# Patient Record
Sex: MALE | Race: WHITE | URBAN - METROPOLITAN AREA
[De-identification: names, ages, dates, MRNs, and addresses within clinical notes are randomized per-mention and may not be internally consistent; named-entity substitution may affect disease eponyms.]

---

## 2017-06-21 ENCOUNTER — OFFICE VISIT (OUTPATIENT)
Dept: ENDOCRINOLOGY | Age: 42
End: 2017-06-21

## 2017-06-21 VITALS
HEART RATE: 103 BPM | RESPIRATION RATE: 18 BRPM | WEIGHT: 199 LBS | OXYGEN SATURATION: 96 % | BODY MASS INDEX: 29.47 KG/M2 | HEIGHT: 69 IN | SYSTOLIC BLOOD PRESSURE: 143 MMHG | DIASTOLIC BLOOD PRESSURE: 95 MMHG | TEMPERATURE: 98.6 F

## 2017-06-21 DIAGNOSIS — R73.03 PREDIABETES: Primary | ICD-10-CM

## 2017-06-21 DIAGNOSIS — E78.2 MIXED HYPERLIPIDEMIA: ICD-10-CM

## 2017-06-21 LAB
GLUCOSE POC: 112 MG/DL
HBA1C MFR BLD HPLC: 5.9 %

## 2017-06-21 RX ORDER — ACETAMINOPHEN 325 MG/1
TABLET ORAL
COMMUNITY

## 2017-06-21 RX ORDER — IBUPROFEN 800 MG/1
TABLET ORAL
COMMUNITY

## 2017-06-21 RX ORDER — PRAVASTATIN SODIUM 20 MG/1
20 TABLET ORAL
COMMUNITY

## 2017-06-21 NOTE — MR AVS SNAPSHOT
Visit Information Date & Time Provider Department Dept. Phone Encounter #  
 6/21/2017 11:00 AM Demarcus Hernandez MD [facility] [phone] [id number] Follow-up Instructions Return if symptoms worsen or fail to improve. Upcoming Health Maintenance Date Due DTaP/Tdap/Td series (1 - Tdap) 6/15/1995 INFLUENZA AGE 9 TO ADULT 8/1/2017 Allergies as of 6/21/2017  Review Complete On: 6/21/2017 By: Demarcus Hernandez MD  
 No Known Allergies Current Immunizations  Never Reviewed No immunizations on file. Not reviewed this visit You Were Diagnosed With   
  
 Codes Comments Prediabetes    -  Primary ICD-10-CM: R73.03 
ICD-9-CM: 790.29 Vitals BP Pulse Temp Resp Height(growth percentile) Weight(growth percentile) (!) 143/95 (BP 1 Location: Right arm, BP Patient Position: Sitting) (!) 103 98.6 °F (37 °C) (Oral) 18 5' 9\" (1.753 m) 199 lb (90.3 kg) SpO2 BMI Smoking Status 96% 29.39 kg/m2 Current Every Day Smoker BMI and BSA Data Body Mass Index Body Surface Area  
 29.39 kg/m 2 2.1 m 2 Your Updated Medication List  
  
   
This list is accurate as of: 6/21/17 12:11 PM.  Always use your most recent med list.  
  
  
  
  
 ibuprofen 800 mg tablet Commonly known as:  MOTRIN Take  by mouth.  
  
 pravastatin 20 mg tablet Commonly known as:  PRAVACHOL Take 20 mg by mouth nightly. TYLENOL 325 mg tablet Generic drug:  acetaminophen Take  by mouth every four (4) hours as needed for Pain. We Performed the Following AMB POC GLUCOSE, QUANTITATIVE, BLOOD [93125 CPT(R)] AMB POC HEMOGLOBIN A1C [25803 CPT(R)] Follow-up Instructions Return if symptoms worsen or fail to improve. Introducing [facility]! [facility] introduces [employer] patient portal. Now you can access parts of your medical record, email your doctor's office, and request medication refills online. 1. In your internet browser, go to [email or web address] 2. Click on the First Time User? Click Here link in the Sign In box. You will see the New Member Sign Up page. 3. Enter your [employer] Access Code exactly as it appears below. You will not need to use this code after youve completed the sign-up process. If you do not sign up before the expiration date, you must request a new code. · [employer] Access Code: [email or web address] Expires: 9/19/2017 12:11 PM 
 
4. Enter the last four digits of your Social Security Number (xxxx) and Date of Birth (mm/dd/yyyy) as indicated and click Submit. You will be taken to the next sign-up page. 5. Create a [employer] ID. This will be your [employer] login ID and cannot be changed, so think of one that is secure and easy to remember. 6. Create a [employer] password. You can change your password at any time. 7. Enter your Password Reset Question and Answer. This can be used at a later time if you forget your password. 8. Enter your e-mail address. You will receive e-mail notification when new information is available in [address] 9. Click Sign Up. You can now view and download portions of your medical record. 10. Click the Download Summary menu link to download a portable copy of your medical information. If you have questions, please visit the Frequently Asked Questions section of the [employer] website. Remember, [employer] is NOT to be used for urgent needs. For medical emergencies, dial 911. Now available from your iPhone and Android! Please provide this summary of care documentation to your next provider. If you have any questions after today's visit, please call [phone].

## 2017-06-21 NOTE — LETTER
6/29/2017 7:46 PM 
 
Patient:  Mitch Avitia YOB: 1975 Date of Visit: 6/21/2017 Dear physician VIA Facsimile: [phone] 
 : Thank you for referring Mr. Mitch Avitia to me for evaluation/treatment. Below are the relevant portions of my assessment and plan of care. Wt Readings from Last 3 Encounters:  
06/21/17 199 lb (90.3 kg) Temp Readings from Last 3 Encounters:  
06/21/17 98.6 °F (37 °C) (Oral) BP Readings from Last 3 Encounters:  
06/21/17 (!) 143/95 Pulse Readings from Last 3 Encounters:  
06/21/17 (!) 103 Eye exam: yes Foot exam: no 
 
HISTORY OF PRESENT ILLNESS Mitch Avitia is a 36 y.o. male. HPI Initial visit for Prediabetes/ diabetes evaluation and   dysmetabolic syndrome He had prediabetic numbers in feb 2017 labs He has few home run  Numbers which are over 140 mg He is interested in knowing the targets for blood glucose levels etc.,  
 
 
 
 
 
Past Medical History:  
Diagnosis Date  Diabetes ([address] 2017  Erectile dysfunction  Hyperlipidemia 2015 Social History Social History  Marital status:  Spouse name: N/A  
 Number of children: N/A  
 Years of education: N/A Occupational History  Not on file. Social History Main Topics  Smoking status: Current Every Day Smoker Packs/day: 1.00 Years: 30.00 Types: Cigarettes  Smokeless tobacco: Never Used  Alcohol use Yes Comment: rarely  Drug use: No  
 Sexual activity: Not on file Other Topics Concern  Not on file Social History Narrative  No narrative on file Family History Problem Relation Age of Onset  No Known Problems Mother  Diabetes Father  Hypertension Father Review of Systems Constitutional: Negative. HENT: Negative. Eyes: Negative for pain and redness. Respiratory: Negative. Cardiovascular: Negative for chest pain, palpitations and leg swelling. Gastrointestinal: Negative. Negative for constipation. Genitourinary: Negative. Musculoskeletal: Negative for myalgias. Skin: Negative. Neurological: Negative. Endo/Heme/Allergies: Negative. Psychiatric/Behavioral: Negative for depression and memory loss. The patient does not have insomnia. Physical Exam  
Constitutional: He is oriented to person, place, and time. He appears well-developed and well-nourished. HENT:  
Head: Normocephalic. Eyes: Conjunctivae and EOM are normal. Pupils are equal, round, and reactive to light. Neck: Normal range of motion. Neck supple. No JVD present. No tracheal deviation present. No thyromegaly present. Cardiovascular: Normal rate, regular rhythm and normal heart sounds. Pulmonary/Chest: Effort normal and breath sounds normal.  
Abdominal: Soft. Bowel sounds are normal.  
Musculoskeletal: Normal range of motion. Lymphadenopathy:  
  He has no cervical adenopathy. Neurological: He is alert and oriented to person, place, and time. He has normal reflexes. Skin: Skin is warm. Psychiatric: He has a normal mood and affect. ASSESSMENT and PLAN 1. Prediabetes : a1c is 5.9 %   From today by finger stick compared to 6.1 % Explained about patho-physiology of prediabetes He is deferring meds 2. Hyperlipidemia : on pravachol But he says he is NOT compliant LDL is 170  - jan 2017 Parents ? Unsure [address] father had CAD at 79 Explained to him the need for statin compliance to prevent a CVD event F/u prn He will send me fasting labs If you have questions, please do not hesitate to call me. I look forward to following Mr. Milton Rouse along with you. Sincerely, Roro Mike MD

## 2017-06-21 NOTE — PROGRESS NOTES
HISTORY OF PRESENT ILLNESS  Roxy Maurice is a 36 y.o. male. HPI    Initial visit for Prediabetes/ diabetes evaluation and   dysmetabolic syndrome     He had prediabetic numbers in feb 2017 labs  He has few home run  Numbers which are over 140 mg     He is interested in knowing the targets for blood glucose levels etc.,             Past Medical History:   Diagnosis Date    Diabetes ([facility] [address] 2017    Erectile dysfunction     Hyperlipidemia 2015       Social History     Social History    Marital status:      Spouse name: N/A    Number of children: N/A    Years of education: N/A     Occupational History    Not on file. Social History Main Topics    Smoking status: Current Every Day Smoker     Packs/day: 1.00     Years: 30.00     Types: Cigarettes    Smokeless tobacco: Never Used    Alcohol use Yes      Comment: rarely    Drug use: No    Sexual activity: Not on file     Other Topics Concern    Not on file     Social History Narrative    No narrative on file       Family History   Problem Relation Age of Onset    No Known Problems Mother     Diabetes Father     Hypertension Father              Review of Systems   Constitutional: Negative. HENT: Negative. Eyes: Negative for pain and redness. Respiratory: Negative. Cardiovascular: Negative for chest pain, palpitations and leg swelling. Gastrointestinal: Negative. Negative for constipation. Genitourinary: Negative. Musculoskeletal: Negative for myalgias. Skin: Negative. Neurological: Negative. Endo/Heme/Allergies: Negative. Psychiatric/Behavioral: Negative for depression and memory loss. The patient does not have insomnia. Physical Exam   Constitutional: He is oriented to person, place, and time. He appears well-developed and well-nourished. HENT:   Head: Normocephalic. Eyes: Conjunctivae and EOM are normal. Pupils are equal, round, and reactive to light. Neck: Normal range of motion. Neck supple.  No JVD present. No tracheal deviation present. No thyromegaly present. Cardiovascular: Normal rate, regular rhythm and normal heart sounds. Pulmonary/Chest: Effort normal and breath sounds normal.   Abdominal: Soft. Bowel sounds are normal.   Musculoskeletal: Normal range of motion. Lymphadenopathy:     He has no cervical adenopathy. Neurological: He is alert and oriented to person, place, and time. He has normal reflexes. Skin: Skin is warm. Psychiatric: He has a normal mood and affect. ASSESSMENT and PLAN    1. Prediabetes : a1c is 5.9 %   From today by finger stick compared to 6.1 %   Explained about patho-physiology of prediabetes   He is deferring meds     2. Hyperlipidemia : on pravachol   But he says he is NOT compliant   LDL is 170  - jan 2017   Parents ?  [id number] father had CAD at 79   Explained to him the need for statin compliance to prevent a CVD event     F/u prn   He will send me fasting labs

## 2017-06-21 NOTE — PROGRESS NOTES
Wt Readings from Last 3 Encounters:   06/21/17 199 lb (90.3 kg)     Temp Readings from Last 3 Encounters:   06/21/17 98.6 °F (37 °C) (Oral)     BP Readings from Last 3 Encounters:   06/21/17 (!) 143/95     Pulse Readings from Last 3 Encounters:   06/21/17 (!) 103   Eye exam: yes  Foot exam: no

## 2018-03-01 ENCOUNTER — OP HISTORICAL/CONVERTED ENCOUNTER (OUTPATIENT)
Dept: OTHER | Age: 43
End: 2018-03-01